# Patient Record
Sex: MALE | Race: OTHER | Employment: OTHER | URBAN - METROPOLITAN AREA
[De-identification: names, ages, dates, MRNs, and addresses within clinical notes are randomized per-mention and may not be internally consistent; named-entity substitution may affect disease eponyms.]

---

## 2019-06-08 ENCOUNTER — APPOINTMENT (OUTPATIENT)
Dept: CT IMAGING | Age: 79
End: 2019-06-08
Attending: EMERGENCY MEDICINE
Payer: MEDICARE

## 2019-06-08 ENCOUNTER — HOSPITAL ENCOUNTER (EMERGENCY)
Age: 79
Discharge: HOME | End: 2019-06-08
Attending: EMERGENCY MEDICINE
Payer: MEDICARE

## 2019-06-08 VITALS
WEIGHT: 140 LBS | TEMPERATURE: 98 F | DIASTOLIC BLOOD PRESSURE: 61 MMHG | BODY MASS INDEX: 24.8 KG/M2 | SYSTOLIC BLOOD PRESSURE: 160 MMHG | RESPIRATION RATE: 16 BRPM | OXYGEN SATURATION: 99 % | HEART RATE: 47 BPM | HEIGHT: 63 IN

## 2019-06-08 DIAGNOSIS — R42 VERTIGO: Primary | ICD-10-CM

## 2019-06-08 DIAGNOSIS — J32.9 SINUSITIS, UNSPECIFIED CHRONICITY, UNSPECIFIED LOCATION: ICD-10-CM

## 2019-06-08 PROCEDURE — 84484 ASSAY OF TROPONIN QUANT: CPT | Performed by: EMERGENCY MEDICINE

## 2019-06-08 PROCEDURE — 99285 EMERGENCY DEPT VISIT HI MDM: CPT

## 2019-06-08 PROCEDURE — 96360 HYDRATION IV INFUSION INIT: CPT

## 2019-06-08 PROCEDURE — 96361 HYDRATE IV INFUSION ADD-ON: CPT

## 2019-06-08 PROCEDURE — 85025 COMPLETE CBC W/AUTO DIFF WBC: CPT | Performed by: EMERGENCY MEDICINE

## 2019-06-08 PROCEDURE — 93010 ELECTROCARDIOGRAM REPORT: CPT

## 2019-06-08 PROCEDURE — 70450 CT HEAD/BRAIN W/O DYE: CPT | Performed by: EMERGENCY MEDICINE

## 2019-06-08 PROCEDURE — 93005 ELECTROCARDIOGRAM TRACING: CPT

## 2019-06-08 PROCEDURE — 80048 BASIC METABOLIC PNL TOTAL CA: CPT | Performed by: EMERGENCY MEDICINE

## 2019-06-08 RX ORDER — AMOXICILLIN AND CLAVULANATE POTASSIUM 875; 125 MG/1; MG/1
1 TABLET, FILM COATED ORAL 2 TIMES DAILY
Qty: 20 TABLET | Refills: 0 | Status: SHIPPED | OUTPATIENT
Start: 2019-06-08 | End: 2019-06-18

## 2019-06-08 RX ORDER — SODIUM CHLORIDE 9 MG/ML
125 INJECTION, SOLUTION INTRAVENOUS CONTINUOUS
Status: DISCONTINUED | OUTPATIENT
Start: 2019-06-08 | End: 2019-06-08

## 2019-06-08 RX ORDER — MECLIZINE HYDROCHLORIDE 25 MG/1
25 TABLET ORAL ONCE
Status: COMPLETED | OUTPATIENT
Start: 2019-06-08 | End: 2019-06-08

## 2019-06-08 RX ORDER — MECLIZINE HYDROCHLORIDE 25 MG/1
25 TABLET ORAL 3 TIMES DAILY PRN
Qty: 15 TABLET | Refills: 0 | Status: SHIPPED | OUTPATIENT
Start: 2019-06-08

## 2019-06-08 RX ORDER — ASPIRIN 81 MG/1
TABLET, CHEWABLE ORAL DAILY
COMMUNITY

## 2019-06-08 NOTE — ED PROVIDER NOTES
Patient Seen in: [facility] Emergency Department In [place]    History   Patient presents with:  Dizziness (neurologic)    Stated Complaint: dizziness that started this evening; + SOB;     HPI    68-year-old male presents emergency room for evaluation of 98.3 °F (36.8 °C)   Temp src Temporal   SpO2 98 %   O2 Device None (Room air)       Current:/61   Pulse (!) 47   Temp 98.3 °F (36.8 °C) (Temporal)   Resp 16   Ht 160 cm (5' 3\")   Wt 63.5 kg   SpO2 99%   BMI 24.80 kg/m²         Physical Exam    GENER CBC W/ DIFFERENTIAL[[id number]]                              Final result                 Please view results for these tests on the individual orders.    RAINBOW DRAW BLUE   RAINBOW DRAW LAVENDER   RAINBOW DRAW LIGHT GREEN   RAINBOW DRAW GOLD   CBC W/ DIF 0

## 2019-06-08 NOTE — ED INITIAL ASSESSMENT (HPI)
Dizziness that started 30 mins , has had  A nasal congestion last couple of days. C/o pain left arm, after doing yard work. Pain worse with movement. Feels like room is spinning. Denies cp. Denies nausea. Denies sob.

## 2021-01-01 ENCOUNTER — EXTERNAL RECORD (OUTPATIENT)
Dept: OTHER | Age: 81
End: 2021-01-01

## 2021-04-27 ENCOUNTER — TELEPHONE (OUTPATIENT)
Dept: ORTHOPEDICS | Age: 81
End: 2021-04-27

## 2021-04-27 ENCOUNTER — IMAGING SERVICES (OUTPATIENT)
Dept: GENERAL RADIOLOGY | Age: 81
End: 2021-04-27
Attending: ORTHOPAEDIC SURGERY

## 2021-04-27 ENCOUNTER — OFFICE VISIT (OUTPATIENT)
Dept: ORTHOPEDICS | Age: 81
End: 2021-04-27

## 2021-04-27 VITALS — BODY MASS INDEX: 25.04 KG/M2 | HEIGHT: 63 IN | WEIGHT: 141.31 LBS

## 2021-04-27 DIAGNOSIS — M17.12 PRIMARY OSTEOARTHRITIS OF LEFT KNEE: Primary | ICD-10-CM

## 2021-04-27 PROCEDURE — 99204 OFFICE O/P NEW MOD 45 MIN: CPT | Performed by: ORTHOPAEDIC SURGERY

## 2021-04-27 PROCEDURE — 73564 X-RAY EXAM KNEE 4 OR MORE: CPT | Performed by: ORTHOPAEDIC SURGERY

## 2021-04-27 RX ORDER — SIMVASTATIN 10 MG
TABLET ORAL
Status: ON HOLD | COMMUNITY
End: 2021-07-15 | Stop reason: ALTCHOICE

## 2021-04-27 RX ORDER — AMLODIPINE BESYLATE 10 MG/1
TABLET ORAL
Status: ON HOLD | COMMUNITY
End: 2021-07-15 | Stop reason: DRUGHIGH

## 2021-04-27 RX ORDER — HYALURONATE SODIUM 20 MG/2 ML
20 SYRINGE (ML) INTRAARTICULAR ONCE
Qty: 6 ML | Refills: 0 | Status: SHIPPED
Start: 2021-04-27 | End: 2021-04-27

## 2021-04-28 RX ORDER — HYALURONATE SODIUM 30 MG/2 ML
30 SYRINGE (ML) INTRAARTICULAR ONCE
Qty: 2 ML | Refills: 0 | Status: SHIPPED
Start: 2021-04-28 | End: 2021-04-28

## 2021-05-20 ENCOUNTER — OFFICE VISIT (OUTPATIENT)
Dept: ORTHOPEDICS | Age: 81
End: 2021-05-20

## 2021-05-20 VITALS — HEIGHT: 63 IN | BODY MASS INDEX: 25.04 KG/M2 | WEIGHT: 141.31 LBS

## 2021-05-20 DIAGNOSIS — M17.12 PRIMARY OSTEOARTHRITIS OF LEFT KNEE: Primary | ICD-10-CM

## 2021-05-20 PROCEDURE — 20610 DRAIN/INJ JOINT/BURSA W/O US: CPT | Performed by: ORTHOPAEDIC SURGERY

## 2021-05-27 ENCOUNTER — OFFICE VISIT (OUTPATIENT)
Dept: ORTHOPEDICS | Age: 81
End: 2021-05-27

## 2021-05-27 VITALS — BODY MASS INDEX: 25.04 KG/M2 | WEIGHT: 141.31 LBS | HEIGHT: 63 IN

## 2021-05-27 DIAGNOSIS — M17.12 PRIMARY OSTEOARTHRITIS OF LEFT KNEE: Primary | ICD-10-CM

## 2021-05-27 PROCEDURE — 20610 DRAIN/INJ JOINT/BURSA W/O US: CPT | Performed by: ORTHOPAEDIC SURGERY

## 2021-06-03 ENCOUNTER — OFFICE VISIT (OUTPATIENT)
Dept: ORTHOPEDICS | Age: 81
End: 2021-06-03

## 2021-06-03 VITALS — WEIGHT: 141.31 LBS | HEIGHT: 63 IN | BODY MASS INDEX: 25.04 KG/M2

## 2021-06-03 DIAGNOSIS — M17.12 PRIMARY OSTEOARTHRITIS OF LEFT KNEE: Primary | ICD-10-CM

## 2021-06-03 PROCEDURE — 20610 DRAIN/INJ JOINT/BURSA W/O US: CPT | Performed by: ORTHOPAEDIC SURGERY

## 2021-06-21 ENCOUNTER — HOSPITAL ENCOUNTER (OUTPATIENT)
Dept: MRI IMAGING | Age: 81
Discharge: HOME | End: 2021-06-21
Attending: UROLOGY

## 2021-06-21 DIAGNOSIS — R97.20 ELEVATED PROSTATE SPECIFIC ANTIGEN (PSA): ICD-10-CM

## 2021-06-21 PROCEDURE — 10002805 HB CONTRAST AGENT: Performed by: UROLOGY

## 2021-06-21 PROCEDURE — A9577 INJ MULTIHANCE: HCPCS | Performed by: UROLOGY

## 2021-06-21 PROCEDURE — 72197 MRI PELVIS W/O & W/DYE: CPT

## 2021-06-21 RX ADMIN — GADOBENATE DIMEGLUMINE 14 ML: 529 INJECTION, SOLUTION INTRAVENOUS at 10:46

## 2021-07-13 ENCOUNTER — ANESTHESIA EVENT (OUTPATIENT)
Dept: SURGERY | Age: 81
DRG: 141 | End: 2021-07-13

## 2021-07-13 RX ORDER — LEVOTHYROXINE SODIUM 0.03 MG/1
25 TABLET ORAL DAILY
COMMUNITY

## 2021-07-13 ASSESSMENT — ACTIVITIES OF DAILY LIVING (ADL)
ADL_SCORE: 12
ADL_BEFORE_ADMISSION: INDEPENDENT
SENSORY_SUPPORT_DEVICES: EYEGLASSES

## 2021-07-14 ENCOUNTER — ANESTHESIA (OUTPATIENT)
Dept: SURGERY | Age: 81
DRG: 141 | End: 2021-07-14

## 2021-07-14 ENCOUNTER — HOSPITAL ENCOUNTER (INPATIENT)
Age: 81
LOS: 3 days | Discharge: HOME | DRG: 141 | End: 2021-07-17
Attending: OTOLARYNGOLOGY | Admitting: OTOLARYNGOLOGY

## 2021-07-14 DIAGNOSIS — Z01.812 PRE-PROCEDURAL LABORATORY EXAMINATIONS: Primary | ICD-10-CM

## 2021-07-14 DIAGNOSIS — C02.9 MALIGNANT NEOPLASM OF TONGUE, UNSPECIFIED (CMD): ICD-10-CM

## 2021-07-14 LAB
ABO + RH BLD: NORMAL
ANION GAP SERPL CALC-SCNC: 14 MMOL/L (ref 10–20)
BASOPHILS # BLD: 0 K/MCL (ref 0–0.3)
BASOPHILS NFR BLD: 0 %
BLD GP AB SCN SERPL QL GEL: NEGATIVE
BUN SERPL-MCNC: 17 MG/DL (ref 6–20)
BUN/CREAT SERPL: 16 (ref 7–25)
CALCIUM SERPL-MCNC: 8.6 MG/DL (ref 8.4–10.2)
CHLORIDE SERPL-SCNC: 105 MMOL/L (ref 98–107)
CO2 SERPL-SCNC: 25 MMOL/L (ref 21–32)
CREAT SERPL-MCNC: 1.04 MG/DL (ref 0.67–1.17)
DEPRECATED RDW RBC: 43.8 FL (ref 39–50)
EOSINOPHIL # BLD: 0 K/MCL (ref 0–0.5)
EOSINOPHIL NFR BLD: 0 %
ERYTHROCYTE [DISTWIDTH] IN BLOOD: 13.9 % (ref 11–15)
FASTING DURATION TIME PATIENT: ABNORMAL H
GFR SERPLBLD BASED ON 1.73 SQ M-ARVRAT: 67 ML/MIN/1.73M2
GLUCOSE SERPL-MCNC: 193 MG/DL (ref 65–99)
HCT VFR BLD CALC: 40.2 % (ref 39–51)
HGB BLD-MCNC: 13.7 G/DL (ref 13–17)
IMM GRANULOCYTES # BLD AUTO: 0 K/MCL (ref 0–0.2)
IMM GRANULOCYTES # BLD: 0 %
LYMPHOCYTES # BLD: 1 K/MCL (ref 1–4)
LYMPHOCYTES NFR BLD: 8 %
MAGNESIUM SERPL-MCNC: 1.8 MG/DL (ref 1.7–2.4)
MCH RBC QN AUTO: 29.4 PG (ref 26–34)
MCHC RBC AUTO-ENTMCNC: 34.1 G/DL (ref 32–36.5)
MCV RBC AUTO: 86.3 FL (ref 78–100)
MONOCYTES # BLD: 0.1 K/MCL (ref 0.3–0.9)
MONOCYTES NFR BLD: 1 %
NEUTROPHILS # BLD: 11.9 K/MCL (ref 1.8–7.7)
NEUTROPHILS NFR BLD: 91 %
NRBC BLD MANUAL-RTO: 0 /100 WBC
PHOSPHATE SERPL-MCNC: 2.9 MG/DL (ref 2.4–4.7)
PLATELET # BLD AUTO: 185 K/MCL (ref 140–450)
POTASSIUM SERPL-SCNC: 3.5 MMOL/L (ref 3.4–5.1)
RBC # BLD: 4.66 MIL/MCL (ref 4.5–5.9)
SODIUM SERPL-SCNC: 140 MMOL/L (ref 135–145)
TYPE AND SCREEN EXPIRATION DATE: NORMAL
WBC # BLD: 13.1 K/MCL (ref 4.2–11)

## 2021-07-14 PROCEDURE — 80048 BASIC METABOLIC PNL TOTAL CA: CPT | Performed by: STUDENT IN AN ORGANIZED HEALTH CARE EDUCATION/TRAINING PROGRAM

## 2021-07-14 PROCEDURE — 10002801 HB RX 250 W/O HCPCS: Performed by: STUDENT IN AN ORGANIZED HEALTH CARE EDUCATION/TRAINING PROGRAM

## 2021-07-14 PROCEDURE — 10002807 HB RX 258: Performed by: STUDENT IN AN ORGANIZED HEALTH CARE EDUCATION/TRAINING PROGRAM

## 2021-07-14 PROCEDURE — 10002803 HB RX 637: Performed by: SURGERY

## 2021-07-14 PROCEDURE — 10002800 HB RX 250 W HCPCS: Performed by: STUDENT IN AN ORGANIZED HEALTH CARE EDUCATION/TRAINING PROGRAM

## 2021-07-14 PROCEDURE — 07T20ZZ RESECTION OF LEFT NECK LYMPHATIC, OPEN APPROACH: ICD-10-PCS | Performed by: OTOLARYNGOLOGY

## 2021-07-14 PROCEDURE — 10002800 HB RX 250 W HCPCS: Performed by: OTOLARYNGOLOGY

## 2021-07-14 PROCEDURE — 85025 COMPLETE CBC W/AUTO DIFF WBC: CPT | Performed by: STUDENT IN AN ORGANIZED HEALTH CARE EDUCATION/TRAINING PROGRAM

## 2021-07-14 PROCEDURE — 10006023 HB SUPPLY 272: Performed by: OTOLARYNGOLOGY

## 2021-07-14 PROCEDURE — 13000037 HB COMPLEX CASE EACH ADD MINUTE: Performed by: OTOLARYNGOLOGY

## 2021-07-14 PROCEDURE — 0HBJXZZ EXCISION OF LEFT UPPER LEG SKIN, EXTERNAL APPROACH: ICD-10-PCS | Performed by: OTOLARYNGOLOGY

## 2021-07-14 PROCEDURE — 13000003 HB ANESTHESIA  GENERAL EA ADD MINUTE: Performed by: OTOLARYNGOLOGY

## 2021-07-14 PROCEDURE — 83735 ASSAY OF MAGNESIUM: CPT | Performed by: STUDENT IN AN ORGANIZED HEALTH CARE EDUCATION/TRAINING PROGRAM

## 2021-07-14 PROCEDURE — 10004451 HB PACU RECOVERY 1ST 30 MINUTES: Performed by: OTOLARYNGOLOGY

## 2021-07-14 PROCEDURE — 0CB7XZZ EXCISION OF TONGUE, EXTERNAL APPROACH: ICD-10-PCS | Performed by: OTOLARYNGOLOGY

## 2021-07-14 PROCEDURE — 10004651 HB RX, NO CHARGE ITEM: Performed by: OTOLARYNGOLOGY

## 2021-07-14 PROCEDURE — 99291 CRITICAL CARE FIRST HOUR: CPT | Performed by: SURGERY

## 2021-07-14 PROCEDURE — 10002801 HB RX 250 W/O HCPCS: Performed by: OTOLARYNGOLOGY

## 2021-07-14 PROCEDURE — 10002803 HB RX 637: Performed by: OTOLARYNGOLOGY

## 2021-07-14 PROCEDURE — 13000036 HB COMPLEX  CASE S/U + 1ST 15 MIN: Performed by: OTOLARYNGOLOGY

## 2021-07-14 PROCEDURE — 86901 BLOOD TYPING SEROLOGIC RH(D): CPT | Performed by: OTOLARYNGOLOGY

## 2021-07-14 PROCEDURE — 10002803 HB RX 637

## 2021-07-14 PROCEDURE — 84100 ASSAY OF PHOSPHORUS: CPT | Performed by: STUDENT IN AN ORGANIZED HEALTH CARE EDUCATION/TRAINING PROGRAM

## 2021-07-14 PROCEDURE — 13000002 HB ANESTHESIA  GENERAL  S/U + 1ST 15 MIN: Performed by: OTOLARYNGOLOGY

## 2021-07-14 PROCEDURE — 10003585 HB ROOM CHARGE INTERMEDIATE CARE

## 2021-07-14 PROCEDURE — 0HR1X74 REPLACEMENT OF FACE SKIN WITH AUTOLOGOUS TISSUE SUBSTITUTE, PARTIAL THICKNESS, EXTERNAL APPROACH: ICD-10-PCS | Performed by: OTOLARYNGOLOGY

## 2021-07-14 PROCEDURE — 88331 PATH CONSLTJ SURG 1 BLK 1SPC: CPT | Performed by: OTOLARYNGOLOGY

## 2021-07-14 PROCEDURE — 10002016 HB COUNTER INCENTIVE SPIROMETRY

## 2021-07-14 RX ORDER — PROPOFOL 10 MG/ML
INJECTION, EMULSION INTRAVENOUS PRN
Status: DISCONTINUED | OUTPATIENT
Start: 2021-07-14 | End: 2021-07-14

## 2021-07-14 RX ORDER — ROCURONIUM BROMIDE 10 MG/ML
INJECTION, SOLUTION INTRAVENOUS PRN
Status: DISCONTINUED | OUTPATIENT
Start: 2021-07-14 | End: 2021-07-14

## 2021-07-14 RX ORDER — CLINDAMYCIN PHOSPHATE 900 MG/50ML
900 INJECTION INTRAVENOUS EVERY 8 HOURS SCHEDULED
Status: COMPLETED | OUTPATIENT
Start: 2021-07-14 | End: 2021-07-14

## 2021-07-14 RX ORDER — ONDANSETRON 2 MG/ML
4 INJECTION INTRAMUSCULAR; INTRAVENOUS EVERY 6 HOURS PRN
Status: DISCONTINUED | OUTPATIENT
Start: 2021-07-14 | End: 2021-07-17 | Stop reason: HOSPADM

## 2021-07-14 RX ORDER — ACETAMINOPHEN 325 MG/1
650 TABLET ORAL EVERY 4 HOURS PRN
Status: DISCONTINUED | OUTPATIENT
Start: 2021-07-14 | End: 2021-07-14

## 2021-07-14 RX ORDER — LIDOCAINE HYDROCHLORIDE AND EPINEPHRINE 10; 10 MG/ML; UG/ML
INJECTION, SOLUTION INFILTRATION; PERINEURAL PRN
Status: DISCONTINUED | OUTPATIENT
Start: 2021-07-14 | End: 2021-07-14 | Stop reason: HOSPADM

## 2021-07-14 RX ORDER — MINERAL OIL
OIL (ML) MISCELLANEOUS PRN
Status: DISCONTINUED | OUTPATIENT
Start: 2021-07-14 | End: 2021-07-14 | Stop reason: HOSPADM

## 2021-07-14 RX ORDER — EPHEDRINE SULFATE/0.9% NACL/PF 50 MG/10ML
SYRINGE (ML) INTRAVENOUS PRN
Status: DISCONTINUED | OUTPATIENT
Start: 2021-07-14 | End: 2021-07-14

## 2021-07-14 RX ORDER — METOCLOPRAMIDE HYDROCHLORIDE 5 MG/ML
5 INJECTION INTRAMUSCULAR; INTRAVENOUS EVERY 6 HOURS PRN
Status: DISCONTINUED | OUTPATIENT
Start: 2021-07-14 | End: 2021-07-14 | Stop reason: HOSPADM

## 2021-07-14 RX ORDER — ONDANSETRON 2 MG/ML
4 INJECTION INTRAMUSCULAR; INTRAVENOUS 2 TIMES DAILY PRN
Status: DISCONTINUED | OUTPATIENT
Start: 2021-07-14 | End: 2021-07-14 | Stop reason: HOSPADM

## 2021-07-14 RX ORDER — SODIUM CHLORIDE 9 MG/ML
INJECTION, SOLUTION INTRAVENOUS CONTINUOUS PRN
Status: DISCONTINUED | OUTPATIENT
Start: 2021-07-14 | End: 2021-07-14

## 2021-07-14 RX ORDER — 0.9 % SODIUM CHLORIDE 0.9 %
2 VIAL (ML) INJECTION EVERY 12 HOURS SCHEDULED
Status: DISCONTINUED | OUTPATIENT
Start: 2021-07-14 | End: 2021-07-17 | Stop reason: HOSPADM

## 2021-07-14 RX ORDER — HYDRALAZINE HYDROCHLORIDE 20 MG/ML
10 INJECTION INTRAMUSCULAR; INTRAVENOUS EVERY 6 HOURS PRN
Status: DISCONTINUED | OUTPATIENT
Start: 2021-07-14 | End: 2021-07-17 | Stop reason: HOSPADM

## 2021-07-14 RX ORDER — SODIUM CHLORIDE, SODIUM LACTATE, POTASSIUM CHLORIDE, CALCIUM CHLORIDE 600; 310; 30; 20 MG/100ML; MG/100ML; MG/100ML; MG/100ML
INJECTION, SOLUTION INTRAVENOUS CONTINUOUS
Status: DISCONTINUED | OUTPATIENT
Start: 2021-07-14 | End: 2021-07-17 | Stop reason: HOSPADM

## 2021-07-14 RX ORDER — BACITRACIN ZINC AND POLYMYXIN B SULFATE 500; 1000 [USP'U]/G; [USP'U]/G
OINTMENT TOPICAL PRN
Status: DISCONTINUED | OUTPATIENT
Start: 2021-07-14 | End: 2021-07-14 | Stop reason: HOSPADM

## 2021-07-14 RX ORDER — LIDOCAINE HYDROCHLORIDE 20 MG/ML
INJECTION, SOLUTION INFILTRATION; PERINEURAL PRN
Status: DISCONTINUED | OUTPATIENT
Start: 2021-07-14 | End: 2021-07-14

## 2021-07-14 RX ORDER — GLYCOPYRROLATE 0.2 MG/ML
INJECTION, SOLUTION INTRAMUSCULAR; INTRAVENOUS PRN
Status: DISCONTINUED | OUTPATIENT
Start: 2021-07-14 | End: 2021-07-14

## 2021-07-14 RX ORDER — SODIUM CHLORIDE, SODIUM LACTATE, POTASSIUM CHLORIDE, CALCIUM CHLORIDE 600; 310; 30; 20 MG/100ML; MG/100ML; MG/100ML; MG/100ML
INJECTION, SOLUTION INTRAVENOUS CONTINUOUS PRN
Status: DISCONTINUED | OUTPATIENT
Start: 2021-07-14 | End: 2021-07-14

## 2021-07-14 RX ORDER — POTASSIUM CHLORIDE 1.5 G/1.58G
40 POWDER, FOR SOLUTION ORAL ONCE
Status: COMPLETED | OUTPATIENT
Start: 2021-07-14 | End: 2021-07-14

## 2021-07-14 RX ORDER — ACETAMINOPHEN 650 MG/1
650 SUPPOSITORY RECTAL EVERY 4 HOURS PRN
Status: DISCONTINUED | OUTPATIENT
Start: 2021-07-14 | End: 2021-07-14 | Stop reason: HOSPADM

## 2021-07-14 RX ORDER — ACETAMINOPHEN 325 MG/1
650 TABLET ORAL EVERY 4 HOURS PRN
Status: DISCONTINUED | OUTPATIENT
Start: 2021-07-14 | End: 2021-07-14 | Stop reason: HOSPADM

## 2021-07-14 RX ORDER — CHLORHEXIDINE GLUCONATE ORAL RINSE 1.2 MG/ML
15 SOLUTION DENTAL EVERY 12 HOURS SCHEDULED
Status: DISCONTINUED | OUTPATIENT
Start: 2021-07-14 | End: 2021-07-17 | Stop reason: HOSPADM

## 2021-07-14 RX ORDER — DEXAMETHASONE SODIUM PHOSPHATE 4 MG/ML
INJECTION, SOLUTION INTRA-ARTICULAR; INTRALESIONAL; INTRAMUSCULAR; INTRAVENOUS; SOFT TISSUE PRN
Status: DISCONTINUED | OUTPATIENT
Start: 2021-07-14 | End: 2021-07-14

## 2021-07-14 RX ORDER — SODIUM CHLORIDE, SODIUM LACTATE, POTASSIUM CHLORIDE, CALCIUM CHLORIDE 600; 310; 30; 20 MG/100ML; MG/100ML; MG/100ML; MG/100ML
10 INJECTION, SOLUTION INTRAVENOUS CONTINUOUS
Status: DISCONTINUED | OUTPATIENT
Start: 2021-07-14 | End: 2021-07-14 | Stop reason: HOSPADM

## 2021-07-14 RX ORDER — MIDAZOLAM HYDROCHLORIDE 1 MG/ML
INJECTION, SOLUTION INTRAMUSCULAR; INTRAVENOUS PRN
Status: DISCONTINUED | OUTPATIENT
Start: 2021-07-14 | End: 2021-07-14

## 2021-07-14 RX ORDER — BISACODYL 10 MG
10 SUPPOSITORY, RECTAL RECTAL DAILY PRN
Status: DISCONTINUED | OUTPATIENT
Start: 2021-07-14 | End: 2021-07-17 | Stop reason: HOSPADM

## 2021-07-14 RX ORDER — PROCHLORPERAZINE EDISYLATE 5 MG/ML
5 INJECTION INTRAMUSCULAR; INTRAVENOUS EVERY 4 HOURS PRN
Status: DISCONTINUED | OUTPATIENT
Start: 2021-07-14 | End: 2021-07-14 | Stop reason: HOSPADM

## 2021-07-14 RX ORDER — ONDANSETRON 2 MG/ML
INJECTION INTRAMUSCULAR; INTRAVENOUS PRN
Status: DISCONTINUED | OUTPATIENT
Start: 2021-07-14 | End: 2021-07-14

## 2021-07-14 RX ORDER — CLINDAMYCIN PHOSPHATE 900 MG/50ML
900 INJECTION INTRAVENOUS ONCE
Status: COMPLETED | OUTPATIENT
Start: 2021-07-14 | End: 2021-07-14

## 2021-07-14 RX ADMIN — Medication 10 MG: at 09:01

## 2021-07-14 RX ADMIN — LIDOCAINE HYDROCHLORIDE 5 ML: 20 INJECTION, SOLUTION INFILTRATION; PERINEURAL at 08:53

## 2021-07-14 RX ADMIN — FENTANYL CITRATE 25 MCG: 50 INJECTION INTRAMUSCULAR; INTRAVENOUS at 14:30

## 2021-07-14 RX ADMIN — CLINDAMYCIN IN 5 PERCENT DEXTROSE 900 MG: 18 INJECTION, SOLUTION INTRAVENOUS at 21:48

## 2021-07-14 RX ADMIN — ROCURONIUM BROMIDE 50 MG: 50 INJECTION, SOLUTION INTRAVENOUS at 08:53

## 2021-07-14 RX ADMIN — SODIUM CHLORIDE: 9 INJECTION, SOLUTION INTRAVENOUS at 08:53

## 2021-07-14 RX ADMIN — SODIUM CHLORIDE, SODIUM LACTATE, POTASSIUM CHLORIDE, AND CALCIUM CHLORIDE: .6; .31; .03; .02 INJECTION, SOLUTION INTRAVENOUS at 15:09

## 2021-07-14 RX ADMIN — FENTANYL CITRATE 100 MCG: 50 INJECTION, SOLUTION INTRAMUSCULAR; INTRAVENOUS at 08:53

## 2021-07-14 RX ADMIN — SODIUM CHLORIDE, POTASSIUM CHLORIDE, SODIUM LACTATE AND CALCIUM CHLORIDE: 600; 310; 30; 20 INJECTION, SOLUTION INTRAVENOUS at 12:05

## 2021-07-14 RX ADMIN — ONDANSETRON 4 MG: 2 INJECTION INTRAMUSCULAR; INTRAVENOUS at 13:33

## 2021-07-14 RX ADMIN — FENTANYL CITRATE 50 MCG: 50 INJECTION INTRAMUSCULAR; INTRAVENOUS at 14:08

## 2021-07-14 RX ADMIN — DEXAMETHASONE SODIUM PHOSPHATE 8 MG: 4 INJECTION, SOLUTION INTRAMUSCULAR; INTRAVENOUS at 08:53

## 2021-07-14 RX ADMIN — GLYCOPYRROLATE 0.4 MG: 0.2 INJECTION, SOLUTION INTRAMUSCULAR; INTRAVENOUS at 11:59

## 2021-07-14 RX ADMIN — FENTANYL CITRATE 25 MCG: 50 INJECTION INTRAMUSCULAR; INTRAVENOUS at 14:16

## 2021-07-14 RX ADMIN — POTASSIUM CHLORIDE 40 MEQ: 1.5 FOR SOLUTION ORAL at 17:41

## 2021-07-14 RX ADMIN — MORPHINE SULFATE 2 MG: 2 INJECTION, SOLUTION INTRAMUSCULAR; INTRAVENOUS at 15:38

## 2021-07-14 RX ADMIN — MIDAZOLAM HYDROCHLORIDE 2 MG: 1 INJECTION, SOLUTION INTRAMUSCULAR; INTRAVENOUS at 08:52

## 2021-07-14 RX ADMIN — SODIUM CHLORIDE, PRESERVATIVE FREE 2 ML: 5 INJECTION INTRAVENOUS at 21:04

## 2021-07-14 RX ADMIN — CLINDAMYCIN IN 5 PERCENT DEXTROSE 900 MG: 18 INJECTION, SOLUTION INTRAVENOUS at 15:43

## 2021-07-14 RX ADMIN — PROPOFOL 100 MG: 10 INJECTION, EMULSION INTRAVENOUS at 08:53

## 2021-07-14 RX ADMIN — CLINDAMYCIN IN 5 PERCENT DEXTROSE 900 MG: 18 INJECTION, SOLUTION INTRAVENOUS at 09:04

## 2021-07-14 ASSESSMENT — COGNITIVE AND FUNCTIONAL STATUS - GENERAL
DO YOU HAVE SERIOUS DIFFICULTY WALKING OR CLIMBING STAIRS: NO
ARE YOU BLIND OR DO YOU HAVE SERIOUS DIFFICULTY SEEING, EVEN WHEN WEARING GLASSES: NO
BECAUSE OF A PHYSICAL, MENTAL, OR EMOTIONAL CONDITION, DO YOU HAVE DIFFICULTY DOING ERRANDS ALONE: NO
ARE YOU DEAF OR DO YOU HAVE SERIOUS DIFFICULTY  HEARING: NO
DO YOU HAVE DIFFICULTY DRESSING OR BATHING: NO
BECAUSE OF A PHYSICAL, MENTAL, OR EMOTIONAL CONDITION, DO YOU HAVE SERIOUS DIFFICULTY CONCENTRATING, REMEMBERING OR MAKING DECISIONS: NO

## 2021-07-14 ASSESSMENT — ACTIVITIES OF DAILY LIVING (ADL)
ADL_SCORE: 12
ADL_SHORT_OF_BREATH: NO
RECENT_DECLINE_ADL: NO
CHRONIC_PAIN_PRESENT: NO
ADL_BEFORE_ADMISSION: INDEPENDENT

## 2021-07-14 ASSESSMENT — ENCOUNTER SYMPTOMS
ABDOMINAL PAIN: 0
SHORTNESS OF BREATH: 0
APPETITE CHANGE: 0
BACK PAIN: 0
AGITATION: 0
WEAKNESS: 0
DIARRHEA: 0
ACTIVITY CHANGE: 0
ABDOMINAL DISTENTION: 0
CONFUSION: 0
CONSTIPATION: 0
NAUSEA: 0
DIZZINESS: 0
COLOR CHANGE: 0
VOMITING: 0

## 2021-07-14 ASSESSMENT — PAIN SCALES - GENERAL
PAINLEVEL_OUTOF10: 3
PAINLEVEL_OUTOF10: 6
PAINLEVEL_OUTOF10: 5
PAINLEVEL_OUTOF10: 2
PAINLEVEL_OUTOF10: 5

## 2021-07-14 ASSESSMENT — LIFESTYLE VARIABLES
AUDIT-C TOTAL SCORE: 0
HOW OFTEN DO YOU HAVE 6 OR MORE DRINKS ON ONE OCCASION: NEVER
HOW MANY STANDARD DRINKS CONTAINING ALCOHOL DO YOU HAVE ON A TYPICAL DAY: 0,1 OR 2
HOW OFTEN DO YOU HAVE A DRINK CONTAINING ALCOHOL: NEVER
ALCOHOL_USE_STATUS: NO OR LOW RISK WITH VALIDATED TOOL

## 2021-07-14 ASSESSMENT — PATIENT HEALTH QUESTIONNAIRE - PHQ9: IS PATIENT ABLE TO COMPLETE PHQ2 OR PHQ9: NO, DEFER TO LATER TIME

## 2021-07-15 PROBLEM — I10 ESSENTIAL HYPERTENSION: Status: ACTIVE | Noted: 2021-07-15

## 2021-07-15 PROBLEM — E03.9 ACQUIRED HYPOTHYROIDISM: Status: ACTIVE | Noted: 2021-07-15

## 2021-07-15 PROBLEM — G89.18 POST-OP PAIN: Status: ACTIVE | Noted: 2021-07-15

## 2021-07-15 PROBLEM — E78.49 OTHER HYPERLIPIDEMIA: Status: ACTIVE | Noted: 2021-07-15

## 2021-07-15 LAB
ANION GAP SERPL CALC-SCNC: 11 MMOL/L (ref 10–20)
BASOPHILS # BLD: 0 K/MCL (ref 0–0.3)
BASOPHILS NFR BLD: 0 %
BUN SERPL-MCNC: 15 MG/DL (ref 6–20)
BUN/CREAT SERPL: 18 (ref 7–25)
CALCIUM SERPL-MCNC: 8.6 MG/DL (ref 8.4–10.2)
CHLORIDE SERPL-SCNC: 107 MMOL/L (ref 98–107)
CO2 SERPL-SCNC: 24 MMOL/L (ref 21–32)
CREAT SERPL-MCNC: 0.84 MG/DL (ref 0.67–1.17)
DEPRECATED RDW RBC: 43.9 FL (ref 39–50)
EOSINOPHIL # BLD: 0 K/MCL (ref 0–0.5)
EOSINOPHIL NFR BLD: 0 %
ERYTHROCYTE [DISTWIDTH] IN BLOOD: 14 % (ref 11–15)
FASTING DURATION TIME PATIENT: ABNORMAL H
GFR SERPLBLD BASED ON 1.73 SQ M-ARVRAT: 82 ML/MIN/1.73M2
GLUCOSE SERPL-MCNC: 128 MG/DL (ref 65–99)
HCT VFR BLD CALC: 37 % (ref 39–51)
HGB BLD-MCNC: 12.7 G/DL (ref 13–17)
IMM GRANULOCYTES # BLD AUTO: 0.1 K/MCL (ref 0–0.2)
IMM GRANULOCYTES # BLD: 1 %
LYMPHOCYTES # BLD: 1.2 K/MCL (ref 1–4)
LYMPHOCYTES NFR BLD: 9 %
MAGNESIUM SERPL-MCNC: 1.9 MG/DL (ref 1.7–2.4)
MCH RBC QN AUTO: 29.5 PG (ref 26–34)
MCHC RBC AUTO-ENTMCNC: 34.3 G/DL (ref 32–36.5)
MCV RBC AUTO: 86 FL (ref 78–100)
MONOCYTES # BLD: 1 K/MCL (ref 0.3–0.9)
MONOCYTES NFR BLD: 7 %
NEUTROPHILS # BLD: 11.1 K/MCL (ref 1.8–7.7)
NEUTROPHILS NFR BLD: 83 %
NRBC BLD MANUAL-RTO: 0 /100 WBC
PHOSPHATE SERPL-MCNC: 3.7 MG/DL (ref 2.4–4.7)
PLATELET # BLD AUTO: 174 K/MCL (ref 140–450)
POTASSIUM SERPL-SCNC: 4.2 MMOL/L (ref 3.4–5.1)
RBC # BLD: 4.3 MIL/MCL (ref 4.5–5.9)
SODIUM SERPL-SCNC: 138 MMOL/L (ref 135–145)
WBC # BLD: 13.4 K/MCL (ref 4.2–11)

## 2021-07-15 PROCEDURE — 99232 SBSQ HOSP IP/OBS MODERATE 35: CPT | Performed by: SURGERY

## 2021-07-15 PROCEDURE — 10002807 HB RX 258: Performed by: SURGERY

## 2021-07-15 PROCEDURE — 10004651 HB RX, NO CHARGE ITEM: Performed by: OTOLARYNGOLOGY

## 2021-07-15 PROCEDURE — 10002803 HB RX 637: Performed by: OTOLARYNGOLOGY

## 2021-07-15 PROCEDURE — 83735 ASSAY OF MAGNESIUM: CPT | Performed by: STUDENT IN AN ORGANIZED HEALTH CARE EDUCATION/TRAINING PROGRAM

## 2021-07-15 PROCEDURE — 10002807 HB RX 258: Performed by: STUDENT IN AN ORGANIZED HEALTH CARE EDUCATION/TRAINING PROGRAM

## 2021-07-15 PROCEDURE — 80048 BASIC METABOLIC PNL TOTAL CA: CPT | Performed by: STUDENT IN AN ORGANIZED HEALTH CARE EDUCATION/TRAINING PROGRAM

## 2021-07-15 PROCEDURE — 84100 ASSAY OF PHOSPHORUS: CPT | Performed by: STUDENT IN AN ORGANIZED HEALTH CARE EDUCATION/TRAINING PROGRAM

## 2021-07-15 PROCEDURE — 10000002 HB ROOM CHARGE MED SURG

## 2021-07-15 PROCEDURE — 99223 1ST HOSP IP/OBS HIGH 75: CPT | Performed by: INTERNAL MEDICINE

## 2021-07-15 PROCEDURE — 10002800 HB RX 250 W HCPCS: Performed by: STUDENT IN AN ORGANIZED HEALTH CARE EDUCATION/TRAINING PROGRAM

## 2021-07-15 PROCEDURE — 10002803 HB RX 637: Performed by: SURGERY

## 2021-07-15 PROCEDURE — 85025 COMPLETE CBC W/AUTO DIFF WBC: CPT | Performed by: STUDENT IN AN ORGANIZED HEALTH CARE EDUCATION/TRAINING PROGRAM

## 2021-07-15 RX ORDER — ENOXAPARIN SODIUM 100 MG/ML
40 INJECTION SUBCUTANEOUS DAILY
Status: DISCONTINUED | OUTPATIENT
Start: 2021-07-15 | End: 2021-07-17 | Stop reason: HOSPADM

## 2021-07-15 RX ORDER — MAGNESIUM SULFATE 1 G/100ML
1 INJECTION INTRAVENOUS ONCE
Status: COMPLETED | OUTPATIENT
Start: 2021-07-15 | End: 2021-07-15

## 2021-07-15 RX ORDER — AMLODIPINE BESYLATE 10 MG/1
10 TABLET ORAL DAILY
Status: DISCONTINUED | OUTPATIENT
Start: 2021-07-16 | End: 2021-07-16

## 2021-07-15 RX ORDER — ATORVASTATIN CALCIUM 10 MG/1
10 TABLET, FILM COATED ORAL NIGHTLY
Status: DISCONTINUED | OUTPATIENT
Start: 2021-07-15 | End: 2021-07-17 | Stop reason: HOSPADM

## 2021-07-15 RX ORDER — AMLODIPINE BESYLATE 5 MG/1
7.5 TABLET ORAL DAILY
COMMUNITY
Start: 2021-06-04

## 2021-07-15 RX ORDER — ATORVASTATIN CALCIUM 40 MG/1
40 TABLET, FILM COATED ORAL DAILY
COMMUNITY

## 2021-07-15 RX ORDER — LEVOTHYROXINE SODIUM 0.03 MG/1
25 TABLET ORAL DAILY
Status: DISCONTINUED | OUTPATIENT
Start: 2021-07-15 | End: 2021-07-17 | Stop reason: HOSPADM

## 2021-07-15 RX ADMIN — ENOXAPARIN SODIUM 40 MG: 40 INJECTION SUBCUTANEOUS at 09:11

## 2021-07-15 RX ADMIN — MAGNESIUM SULFATE HEPTAHYDRATE 1 G: 1 INJECTION, SOLUTION INTRAVENOUS at 09:12

## 2021-07-15 RX ADMIN — LEVOTHYROXINE SODIUM 25 MCG: 0.03 TABLET ORAL at 09:11

## 2021-07-15 RX ADMIN — CHLORHEXIDINE GLUCONATE 15 ML: 1.2 RINSE ORAL at 21:29

## 2021-07-15 RX ADMIN — ATORVASTATIN CALCIUM 10 MG: 10 TABLET, FILM COATED ORAL at 21:28

## 2021-07-15 RX ADMIN — CHLORHEXIDINE GLUCONATE 15 ML: 1.2 RINSE ORAL at 08:20

## 2021-07-15 RX ADMIN — SODIUM CHLORIDE, PRESERVATIVE FREE 2 ML: 5 INJECTION INTRAVENOUS at 08:21

## 2021-07-15 RX ADMIN — SODIUM CHLORIDE, SODIUM LACTATE, POTASSIUM CHLORIDE, AND CALCIUM CHLORIDE: .6; .31; .03; .02 INJECTION, SOLUTION INTRAVENOUS at 05:56

## 2021-07-15 RX ADMIN — SODIUM CHLORIDE, PRESERVATIVE FREE 2 ML: 5 INJECTION INTRAVENOUS at 21:30

## 2021-07-15 ASSESSMENT — PAIN SCALES - GENERAL
PAINLEVEL_OUTOF10: 0

## 2021-07-15 ASSESSMENT — MOVEMENT AND STRENGTH ASSESSMENTS
RUE_ASSESSMENT: NORMAL/COMPLETE ROM AGAINST GRAVITY WITH FULL RESISTANCE
LLE_ASSESSMENT: NORMAL/COMPLETE ROM AGAINST GRAVITY WITH FULL RESISTANCE
RUE_ASSESSMENT: NORMAL/COMPLETE ROM AGAINST GRAVITY WITH FULL RESISTANCE
RLE_ASSESSMENT: NORMAL/COMPLETE ROM AGAINST GRAVITY WITH FULL RESISTANCE
LUE_ASSESSMENT: NORMAL/COMPLETE ROM AGAINST GRAVITY WITH FULL RESISTANCE
LUE_ASSESSMENT: NORMAL/COMPLETE ROM AGAINST GRAVITY WITH FULL RESISTANCE
RLE_ASSESSMENT: NORMAL/COMPLETE ROM AGAINST GRAVITY WITH FULL RESISTANCE
LLE_ASSESSMENT: NORMAL/COMPLETE ROM AGAINST GRAVITY WITH FULL RESISTANCE

## 2021-07-16 LAB
ANION GAP SERPL CALC-SCNC: 10 MMOL/L (ref 10–20)
BASOPHILS # BLD: 0.1 K/MCL (ref 0–0.3)
BASOPHILS NFR BLD: 1 %
BUN SERPL-MCNC: 14 MG/DL (ref 6–20)
BUN/CREAT SERPL: 17 (ref 7–25)
CALCIUM SERPL-MCNC: 8.6 MG/DL (ref 8.4–10.2)
CHLORIDE SERPL-SCNC: 105 MMOL/L (ref 98–107)
CO2 SERPL-SCNC: 27 MMOL/L (ref 21–32)
CREAT SERPL-MCNC: 0.84 MG/DL (ref 0.67–1.17)
DEPRECATED RDW RBC: 44.5 FL (ref 39–50)
EOSINOPHIL # BLD: 0 K/MCL (ref 0–0.5)
EOSINOPHIL NFR BLD: 0 %
ERYTHROCYTE [DISTWIDTH] IN BLOOD: 14.2 % (ref 11–15)
FASTING DURATION TIME PATIENT: ABNORMAL H
GFR SERPLBLD BASED ON 1.73 SQ M-ARVRAT: 82 ML/MIN/1.73M2
GLUCOSE SERPL-MCNC: 137 MG/DL (ref 65–99)
HCT VFR BLD CALC: 37.3 % (ref 39–51)
HGB BLD-MCNC: 12.8 G/DL (ref 13–17)
IMM GRANULOCYTES # BLD AUTO: 0 K/MCL (ref 0–0.2)
IMM GRANULOCYTES # BLD: 0 %
LYMPHOCYTES # BLD: 1.6 K/MCL (ref 1–4)
LYMPHOCYTES NFR BLD: 17 %
MAGNESIUM SERPL-MCNC: 2.1 MG/DL (ref 1.7–2.4)
MCH RBC QN AUTO: 29.6 PG (ref 26–34)
MCHC RBC AUTO-ENTMCNC: 34.3 G/DL (ref 32–36.5)
MCV RBC AUTO: 86.1 FL (ref 78–100)
MONOCYTES # BLD: 0.9 K/MCL (ref 0.3–0.9)
MONOCYTES NFR BLD: 9 %
NEUTROPHILS # BLD: 7.2 K/MCL (ref 1.8–7.7)
NEUTROPHILS NFR BLD: 73 %
NRBC BLD MANUAL-RTO: 0 /100 WBC
PHOSPHATE SERPL-MCNC: 2.3 MG/DL (ref 2.4–4.7)
PLATELET # BLD AUTO: 160 K/MCL (ref 140–450)
POTASSIUM SERPL-SCNC: 3.7 MMOL/L (ref 3.4–5.1)
RBC # BLD: 4.33 MIL/MCL (ref 4.5–5.9)
SODIUM SERPL-SCNC: 138 MMOL/L (ref 135–145)
WBC # BLD: 9.8 K/MCL (ref 4.2–11)

## 2021-07-16 PROCEDURE — 83735 ASSAY OF MAGNESIUM: CPT | Performed by: STUDENT IN AN ORGANIZED HEALTH CARE EDUCATION/TRAINING PROGRAM

## 2021-07-16 PROCEDURE — 10002800 HB RX 250 W HCPCS: Performed by: STUDENT IN AN ORGANIZED HEALTH CARE EDUCATION/TRAINING PROGRAM

## 2021-07-16 PROCEDURE — 10002803 HB RX 637: Performed by: INTERNAL MEDICINE

## 2021-07-16 PROCEDURE — 84100 ASSAY OF PHOSPHORUS: CPT | Performed by: STUDENT IN AN ORGANIZED HEALTH CARE EDUCATION/TRAINING PROGRAM

## 2021-07-16 PROCEDURE — 10002803 HB RX 637: Performed by: SURGERY

## 2021-07-16 PROCEDURE — 80048 BASIC METABOLIC PNL TOTAL CA: CPT | Performed by: STUDENT IN AN ORGANIZED HEALTH CARE EDUCATION/TRAINING PROGRAM

## 2021-07-16 PROCEDURE — 10000002 HB ROOM CHARGE MED SURG

## 2021-07-16 PROCEDURE — 85025 COMPLETE CBC W/AUTO DIFF WBC: CPT | Performed by: STUDENT IN AN ORGANIZED HEALTH CARE EDUCATION/TRAINING PROGRAM

## 2021-07-16 PROCEDURE — 99233 SBSQ HOSP IP/OBS HIGH 50: CPT | Performed by: INTERNAL MEDICINE

## 2021-07-16 PROCEDURE — 36415 COLL VENOUS BLD VENIPUNCTURE: CPT | Performed by: STUDENT IN AN ORGANIZED HEALTH CARE EDUCATION/TRAINING PROGRAM

## 2021-07-16 PROCEDURE — 10002803 HB RX 637: Performed by: OTOLARYNGOLOGY

## 2021-07-16 RX ORDER — POTASSIUM CHLORIDE 20 MEQ/1
40 TABLET, EXTENDED RELEASE ORAL ONCE
Status: COMPLETED | OUTPATIENT
Start: 2021-07-16 | End: 2021-07-16

## 2021-07-16 RX ORDER — SENNOSIDES A AND B 8.6 MG/1
1 TABLET, FILM COATED ORAL NIGHTLY
Status: DISCONTINUED | OUTPATIENT
Start: 2021-07-16 | End: 2021-07-17 | Stop reason: HOSPADM

## 2021-07-16 RX ORDER — ACETAMINOPHEN 160 MG/5ML
500 LIQUID ORAL EVERY 6 HOURS PRN
Status: DISCONTINUED | OUTPATIENT
Start: 2021-07-16 | End: 2021-07-17 | Stop reason: HOSPADM

## 2021-07-16 RX ORDER — DOCUSATE SODIUM 100 MG/1
100 CAPSULE, LIQUID FILLED ORAL DAILY
Status: DISCONTINUED | OUTPATIENT
Start: 2021-07-16 | End: 2021-07-16

## 2021-07-16 RX ORDER — DOCUSATE SODIUM 100 MG/1
100 CAPSULE, LIQUID FILLED ORAL NIGHTLY
Status: DISCONTINUED | OUTPATIENT
Start: 2021-07-16 | End: 2021-07-17 | Stop reason: HOSPADM

## 2021-07-16 RX ADMIN — POTASSIUM CHLORIDE 40 MEQ: 1500 TABLET, EXTENDED RELEASE ORAL at 08:36

## 2021-07-16 RX ADMIN — STANDARDIZED SENNA CONCENTRATE 8.6 MG: 8.6 TABLET ORAL at 21:08

## 2021-07-16 RX ADMIN — DIBASIC SODIUM PHOSPHATE, MONOBASIC POTASSIUM PHOSPHATE AND MONOBASIC SODIUM PHOSPHATE 250 MG: 852; 155; 130 TABLET ORAL at 13:18

## 2021-07-16 RX ADMIN — ENOXAPARIN SODIUM 40 MG: 40 INJECTION SUBCUTANEOUS at 08:39

## 2021-07-16 RX ADMIN — DOCUSATE SODIUM 100 MG: 100 CAPSULE, LIQUID FILLED ORAL at 21:08

## 2021-07-16 RX ADMIN — AMLODIPINE BESYLATE 10 MG: 10 TABLET ORAL at 08:38

## 2021-07-16 RX ADMIN — CHLORHEXIDINE GLUCONATE 15 ML: 1.2 RINSE ORAL at 21:08

## 2021-07-16 RX ADMIN — ATORVASTATIN CALCIUM 10 MG: 10 TABLET, FILM COATED ORAL at 21:08

## 2021-07-16 RX ADMIN — CHLORHEXIDINE GLUCONATE 15 ML: 1.2 RINSE ORAL at 08:39

## 2021-07-16 RX ADMIN — LEVOTHYROXINE SODIUM 25 MCG: 0.03 TABLET ORAL at 05:23

## 2021-07-16 RX ADMIN — DIBASIC SODIUM PHOSPHATE, MONOBASIC POTASSIUM PHOSPHATE AND MONOBASIC SODIUM PHOSPHATE 250 MG: 852; 155; 130 TABLET ORAL at 08:38

## 2021-07-16 RX ADMIN — ACETAMINOPHEN 500 MG: 650 SOLUTION ORAL at 22:22

## 2021-07-16 RX ADMIN — ACETAMINOPHEN 500 MG: 650 SOLUTION ORAL at 16:14

## 2021-07-16 ASSESSMENT — PAIN SCALES - GENERAL
PAINLEVEL_OUTOF10: 6
PAINLEVEL_OUTOF10: 0
PAINLEVEL_OUTOF10: 6
PAINLEVEL_OUTOF10: 4
PAINLEVEL_OUTOF10: 5

## 2021-07-17 VITALS
OXYGEN SATURATION: 97 % | SYSTOLIC BLOOD PRESSURE: 136 MMHG | WEIGHT: 137.35 LBS | DIASTOLIC BLOOD PRESSURE: 69 MMHG | RESPIRATION RATE: 16 BRPM | HEIGHT: 62 IN | TEMPERATURE: 97.9 F | BODY MASS INDEX: 25.27 KG/M2 | HEART RATE: 67 BPM

## 2021-07-17 LAB
ANION GAP SERPL CALC-SCNC: 12 MMOL/L (ref 10–20)
BASOPHILS # BLD: 0 K/MCL (ref 0–0.3)
BASOPHILS NFR BLD: 0 %
BUN SERPL-MCNC: 10 MG/DL (ref 6–20)
BUN/CREAT SERPL: 12 (ref 7–25)
CALCIUM SERPL-MCNC: 8.4 MG/DL (ref 8.4–10.2)
CHLORIDE SERPL-SCNC: 102 MMOL/L (ref 98–107)
CO2 SERPL-SCNC: 25 MMOL/L (ref 21–32)
CREAT SERPL-MCNC: 0.83 MG/DL (ref 0.67–1.17)
DEPRECATED RDW RBC: 43.8 FL (ref 39–50)
EOSINOPHIL # BLD: 0.1 K/MCL (ref 0–0.5)
EOSINOPHIL NFR BLD: 1 %
ERYTHROCYTE [DISTWIDTH] IN BLOOD: 13.9 % (ref 11–15)
FASTING DURATION TIME PATIENT: ABNORMAL H
GFR SERPLBLD BASED ON 1.73 SQ M-ARVRAT: 82 ML/MIN/1.73M2
GLUCOSE SERPL-MCNC: 149 MG/DL (ref 65–99)
HCT VFR BLD CALC: 37.2 % (ref 39–51)
HGB BLD-MCNC: 12.7 G/DL (ref 13–17)
IMM GRANULOCYTES # BLD AUTO: 0 K/MCL (ref 0–0.2)
IMM GRANULOCYTES # BLD: 0 %
LYMPHOCYTES # BLD: 1.4 K/MCL (ref 1–4)
LYMPHOCYTES NFR BLD: 15 %
MAGNESIUM SERPL-MCNC: 2 MG/DL (ref 1.7–2.4)
MCH RBC QN AUTO: 29.7 PG (ref 26–34)
MCHC RBC AUTO-ENTMCNC: 34.1 G/DL (ref 32–36.5)
MCV RBC AUTO: 86.9 FL (ref 78–100)
MONOCYTES # BLD: 0.9 K/MCL (ref 0.3–0.9)
MONOCYTES NFR BLD: 9 %
NEUTROPHILS # BLD: 6.8 K/MCL (ref 1.8–7.7)
NEUTROPHILS NFR BLD: 75 %
NRBC BLD MANUAL-RTO: 0 /100 WBC
PHOSPHATE SERPL-MCNC: 3 MG/DL (ref 2.4–4.7)
PLATELET # BLD AUTO: 167 K/MCL (ref 140–450)
POTASSIUM SERPL-SCNC: 3.7 MMOL/L (ref 3.4–5.1)
RBC # BLD: 4.28 MIL/MCL (ref 4.5–5.9)
SODIUM SERPL-SCNC: 135 MMOL/L (ref 135–145)
WBC # BLD: 9.3 K/MCL (ref 4.2–11)

## 2021-07-17 PROCEDURE — 84100 ASSAY OF PHOSPHORUS: CPT | Performed by: STUDENT IN AN ORGANIZED HEALTH CARE EDUCATION/TRAINING PROGRAM

## 2021-07-17 PROCEDURE — 83735 ASSAY OF MAGNESIUM: CPT | Performed by: STUDENT IN AN ORGANIZED HEALTH CARE EDUCATION/TRAINING PROGRAM

## 2021-07-17 PROCEDURE — 99232 SBSQ HOSP IP/OBS MODERATE 35: CPT | Performed by: INTERNAL MEDICINE

## 2021-07-17 PROCEDURE — 36415 COLL VENOUS BLD VENIPUNCTURE: CPT | Performed by: STUDENT IN AN ORGANIZED HEALTH CARE EDUCATION/TRAINING PROGRAM

## 2021-07-17 PROCEDURE — 85025 COMPLETE CBC W/AUTO DIFF WBC: CPT | Performed by: STUDENT IN AN ORGANIZED HEALTH CARE EDUCATION/TRAINING PROGRAM

## 2021-07-17 PROCEDURE — 80048 BASIC METABOLIC PNL TOTAL CA: CPT | Performed by: STUDENT IN AN ORGANIZED HEALTH CARE EDUCATION/TRAINING PROGRAM

## 2021-07-17 PROCEDURE — 10002803 HB RX 637: Performed by: STUDENT IN AN ORGANIZED HEALTH CARE EDUCATION/TRAINING PROGRAM

## 2021-07-17 PROCEDURE — 10002803 HB RX 637: Performed by: SURGERY

## 2021-07-17 PROCEDURE — 10002800 HB RX 250 W HCPCS: Performed by: STUDENT IN AN ORGANIZED HEALTH CARE EDUCATION/TRAINING PROGRAM

## 2021-07-17 PROCEDURE — 10002803 HB RX 637: Performed by: INTERNAL MEDICINE

## 2021-07-17 PROCEDURE — 10002803 HB RX 637: Performed by: OTOLARYNGOLOGY

## 2021-07-17 PROCEDURE — 10004651 HB RX, NO CHARGE ITEM: Performed by: OTOLARYNGOLOGY

## 2021-07-17 RX ORDER — PSEUDOEPHEDRINE HCL 30 MG
100 TABLET ORAL 2 TIMES DAILY
Qty: 1 CAPSULE | Refills: 0 | INPATIENT
Start: 2021-07-17

## 2021-07-17 RX ORDER — POTASSIUM CHLORIDE 20 MEQ/1
40 TABLET, EXTENDED RELEASE ORAL ONCE
Status: DISCONTINUED | OUTPATIENT
Start: 2021-07-17 | End: 2021-07-17 | Stop reason: SDUPTHER

## 2021-07-17 RX ORDER — POTASSIUM CHLORIDE 1.5 G/1.58G
40 POWDER, FOR SOLUTION ORAL ONCE
Status: COMPLETED | OUTPATIENT
Start: 2021-07-17 | End: 2021-07-17

## 2021-07-17 RX ORDER — HYDROCODONE BITARTRATE AND ACETAMINOPHEN 5; 325 MG/1; MG/1
1 TABLET ORAL EVERY 6 HOURS PRN
Qty: 20 TABLET | Refills: 0 | INPATIENT
Start: 2021-07-17

## 2021-07-17 RX ADMIN — SODIUM CHLORIDE, PRESERVATIVE FREE 2 ML: 5 INJECTION INTRAVENOUS at 01:10

## 2021-07-17 RX ADMIN — SODIUM CHLORIDE, PRESERVATIVE FREE 2 ML: 5 INJECTION INTRAVENOUS at 09:00

## 2021-07-17 RX ADMIN — LEVOTHYROXINE SODIUM 25 MCG: 0.03 TABLET ORAL at 05:31

## 2021-07-17 RX ADMIN — ENOXAPARIN SODIUM 40 MG: 40 INJECTION SUBCUTANEOUS at 09:00

## 2021-07-17 RX ADMIN — ACETAMINOPHEN 500 MG: 650 SOLUTION ORAL at 05:30

## 2021-07-17 RX ADMIN — CHLORHEXIDINE GLUCONATE 15 ML: 1.2 RINSE ORAL at 09:00

## 2021-07-17 RX ADMIN — POTASSIUM CHLORIDE 40 MEQ: 1.5 FOR SOLUTION ORAL at 11:00

## 2021-07-17 RX ADMIN — AMLODIPINE BESYLATE 7.5 MG: 2.5 TABLET ORAL at 08:59

## 2021-07-17 ASSESSMENT — PAIN SCALES - GENERAL
PAINLEVEL_OUTOF10: 5
PAINLEVEL_OUTOF10: 5
PAINLEVEL_OUTOF10: 2

## 2021-07-22 LAB
ASR DISCLAIMER: NORMAL
CASE RPRT: NORMAL
CLINICAL INFO: NORMAL
IHC PROGNOSTIC MARKER RESULT, ADDENDUM: NORMAL
PATH REPORT ADDENDUM.SYNOPTIC DOC: NORMAL
PATH REPORT.FINAL DX SPEC: NORMAL
PATH REPORT.FINAL DX SPEC: NORMAL
PATH REPORT.GROSS SPEC: NORMAL
PATH REPORT.INTRAOP OBS SPEC DOC: NORMAL

## 2022-03-30 ENCOUNTER — TELEPHONE (OUTPATIENT)
Dept: ORTHOPEDICS | Age: 82
End: 2022-03-30

## 2022-04-05 ENCOUNTER — OFFICE VISIT (OUTPATIENT)
Dept: ORTHOPEDICS | Age: 82
End: 2022-04-05

## 2022-04-05 ENCOUNTER — TELEPHONE (OUTPATIENT)
Dept: ORTHOPEDICS | Age: 82
End: 2022-04-05

## 2022-04-05 VITALS — BODY MASS INDEX: 23.08 KG/M2 | WEIGHT: 125.44 LBS | HEIGHT: 62 IN

## 2022-04-05 DIAGNOSIS — M17.12 PRIMARY OSTEOARTHRITIS OF LEFT KNEE: Primary | ICD-10-CM

## 2022-04-05 PROCEDURE — 99213 OFFICE O/P EST LOW 20 MIN: CPT | Performed by: ORTHOPAEDIC SURGERY

## 2022-04-05 RX ORDER — AZELASTINE 1 MG/ML
SPRAY, METERED NASAL EVERY 12 HOURS
COMMUNITY
Start: 2021-10-19

## 2022-04-05 RX ORDER — FLUTICASONE PROPIONATE 50 MCG
SPRAY, SUSPENSION (ML) NASAL EVERY 24 HOURS
COMMUNITY
Start: 2021-10-19

## 2022-04-05 RX ORDER — HYALURONATE SODIUM 30 MG/2 ML
30 SYRINGE (ML) INTRAARTICULAR ONCE
Qty: 2 ML | Refills: 0 | Status: SHIPPED
Start: 2022-04-05 | End: 2022-04-05

## 2022-04-05 ASSESSMENT — PAIN SCALES - GENERAL: PAINLEVEL: 6

## 2022-04-19 ENCOUNTER — OFFICE VISIT (OUTPATIENT)
Dept: ORTHOPEDICS | Age: 82
End: 2022-04-19

## 2022-04-19 VITALS — BODY MASS INDEX: 22.48 KG/M2 | WEIGHT: 126.88 LBS | HEIGHT: 63 IN

## 2022-04-19 DIAGNOSIS — M17.12 PRIMARY OSTEOARTHRITIS OF LEFT KNEE: Primary | ICD-10-CM

## 2022-04-19 PROCEDURE — 20610 DRAIN/INJ JOINT/BURSA W/O US: CPT | Performed by: ORTHOPAEDIC SURGERY

## 2022-04-26 ENCOUNTER — OFFICE VISIT (OUTPATIENT)
Dept: ORTHOPEDICS | Age: 82
End: 2022-04-26

## 2022-04-26 VITALS — WEIGHT: 126 LBS | HEIGHT: 63 IN | BODY MASS INDEX: 22.32 KG/M2

## 2022-04-26 DIAGNOSIS — M17.12 PRIMARY OSTEOARTHRITIS OF LEFT KNEE: Primary | ICD-10-CM

## 2022-04-26 PROCEDURE — 20610 DRAIN/INJ JOINT/BURSA W/O US: CPT | Performed by: ORTHOPAEDIC SURGERY

## 2022-05-03 ENCOUNTER — OFFICE VISIT (OUTPATIENT)
Dept: ORTHOPEDICS | Age: 82
End: 2022-05-03

## 2022-05-03 VITALS — WEIGHT: 126.1 LBS | BODY MASS INDEX: 22.34 KG/M2 | HEIGHT: 63 IN

## 2022-05-03 DIAGNOSIS — M17.12 PRIMARY OSTEOARTHRITIS OF LEFT KNEE: Primary | ICD-10-CM

## 2022-05-03 PROCEDURE — 20610 DRAIN/INJ JOINT/BURSA W/O US: CPT | Performed by: PHYSICIAN ASSISTANT

## 2022-05-03 RX ORDER — KETOROLAC TROMETHAMINE 30 MG/ML
30 INJECTION, SOLUTION INTRAMUSCULAR; INTRAVENOUS
Status: COMPLETED | OUTPATIENT
Start: 2022-05-03 | End: 2022-05-03

## 2022-05-03 RX ADMIN — KETOROLAC TROMETHAMINE 30 MG: 30 INJECTION, SOLUTION INTRAMUSCULAR; INTRAVENOUS at 10:22

## 2022-05-03 ASSESSMENT — PAIN SCALES - GENERAL: PAINLEVEL: 5

## 2022-08-22 ENCOUNTER — TELEPHONE (OUTPATIENT)
Dept: ORTHOPEDICS | Age: 82
End: 2022-08-22

## 2022-08-26 ENCOUNTER — TELEPHONE (OUTPATIENT)
Dept: ORTHOPEDICS | Age: 82
End: 2022-08-26

## 2024-08-16 ENCOUNTER — APPOINTMENT (OUTPATIENT)
Dept: ORTHOPEDICS | Age: 84
End: 2024-08-16

## 2024-08-16 VITALS — HEIGHT: 63 IN | BODY MASS INDEX: 22.32 KG/M2 | WEIGHT: 126 LBS

## 2024-08-16 DIAGNOSIS — M17.12 PRIMARY OSTEOARTHRITIS OF LEFT KNEE: Primary | ICD-10-CM

## 2024-08-16 RX ORDER — HYALURONATE SODIUM 16.8MG/2ML
16.8 SYRINGE (ML) INTRAARTICULAR ONCE
Qty: 2 ML | Refills: 0 | Status: SHIPPED
Start: 2024-08-16 | End: 2024-08-16

## 2024-08-26 ENCOUNTER — TELEPHONE (OUTPATIENT)
Dept: ORTHOPEDICS | Age: 84
End: 2024-08-26

## 2024-08-26 DIAGNOSIS — M17.12 PRIMARY OSTEOARTHRITIS OF LEFT KNEE: Primary | ICD-10-CM

## 2024-08-26 RX ORDER — HYALURONATE SODIUM 16.8MG/2ML
16.8 SYRINGE (ML) INTRAARTICULAR ONCE
Qty: 2 ML | Refills: 0 | Status: SHIPPED
Start: 2024-08-26 | End: 2024-08-26

## 2024-08-29 ENCOUNTER — TELEPHONE (OUTPATIENT)
Dept: ORTHOPEDICS | Age: 84
End: 2024-08-29

## 2024-08-29 DIAGNOSIS — M17.12 PRIMARY OSTEOARTHRITIS OF LEFT KNEE: Primary | ICD-10-CM

## 2024-08-29 RX ORDER — HYALURONATE SODIUM 16.8MG/2ML
16.8 SYRINGE (ML) INTRAARTICULAR
Qty: 6 ML | Refills: 0 | Status: SHIPPED
Start: 2024-08-29 | End: 2024-09-13

## 2024-09-03 ENCOUNTER — APPOINTMENT (OUTPATIENT)
Dept: ORTHOPEDICS | Age: 84
End: 2024-09-03

## 2024-09-03 VITALS — BODY MASS INDEX: 22.32 KG/M2 | HEIGHT: 63 IN | WEIGHT: 126 LBS

## 2024-09-03 DIAGNOSIS — M17.12 PRIMARY OSTEOARTHRITIS OF LEFT KNEE: Primary | ICD-10-CM

## 2024-09-10 ENCOUNTER — APPOINTMENT (OUTPATIENT)
Dept: ORTHOPEDICS | Age: 84
End: 2024-09-10

## 2024-09-10 DIAGNOSIS — M17.12 PRIMARY OSTEOARTHRITIS OF LEFT KNEE: Primary | ICD-10-CM

## 2024-09-17 ENCOUNTER — APPOINTMENT (OUTPATIENT)
Dept: ORTHOPEDICS | Age: 84
End: 2024-09-17

## 2024-09-17 VITALS — BODY MASS INDEX: 22.32 KG/M2 | HEIGHT: 63 IN | WEIGHT: 126 LBS

## 2024-09-17 DIAGNOSIS — M17.12 PRIMARY OSTEOARTHRITIS OF LEFT KNEE: Primary | ICD-10-CM

## 2025-06-03 ENCOUNTER — OFFICE VISIT (OUTPATIENT)
Dept: ORTHOPEDICS | Age: 85
End: 2025-06-03

## 2025-06-03 ENCOUNTER — TELEPHONE (OUTPATIENT)
Dept: ORTHOPEDICS | Age: 85
End: 2025-06-03

## 2025-06-03 ENCOUNTER — IMAGING SERVICES (OUTPATIENT)
Dept: GENERAL RADIOLOGY | Age: 85
End: 2025-06-03
Attending: ORTHOPAEDIC SURGERY

## 2025-06-03 VITALS — HEIGHT: 63 IN | WEIGHT: 127.65 LBS | BODY MASS INDEX: 22.62 KG/M2

## 2025-06-03 DIAGNOSIS — M17.12 PRIMARY OSTEOARTHRITIS OF LEFT KNEE: Primary | ICD-10-CM

## 2025-06-03 PROCEDURE — 99213 OFFICE O/P EST LOW 20 MIN: CPT | Performed by: ORTHOPAEDIC SURGERY

## 2025-06-03 PROCEDURE — 73564 X-RAY EXAM KNEE 4 OR MORE: CPT | Performed by: ORTHOPAEDIC SURGERY

## 2025-06-03 RX ORDER — HYALURONATE SODIUM 16.8MG/2ML
16.8 SYRINGE (ML) INTRAARTICULAR
Qty: 3 EACH | Refills: 0 | Status: SHIPPED | OUTPATIENT
Start: 2025-06-03

## 2025-06-10 ENCOUNTER — APPOINTMENT (OUTPATIENT)
Dept: ORTHOPEDICS | Age: 85
End: 2025-06-10

## 2025-06-10 VITALS — WEIGHT: 127 LBS | BODY MASS INDEX: 22.5 KG/M2 | HEIGHT: 63 IN

## 2025-06-10 DIAGNOSIS — M17.12 PRIMARY OSTEOARTHRITIS OF LEFT KNEE: Primary | ICD-10-CM

## 2025-06-17 ENCOUNTER — OFFICE VISIT (OUTPATIENT)
Dept: ORTHOPEDICS | Age: 85
End: 2025-06-17

## 2025-06-17 ENCOUNTER — APPOINTMENT (OUTPATIENT)
Dept: ORTHOPEDICS | Age: 85
End: 2025-06-17

## 2025-06-17 VITALS — BODY MASS INDEX: 23.37 KG/M2 | WEIGHT: 127 LBS | HEIGHT: 62 IN

## 2025-06-17 DIAGNOSIS — M17.12 PRIMARY OSTEOARTHRITIS OF LEFT KNEE: Primary | ICD-10-CM

## 2025-06-24 ENCOUNTER — APPOINTMENT (OUTPATIENT)
Dept: ORTHOPEDICS | Age: 85
End: 2025-06-24

## 2025-06-24 VITALS — HEIGHT: 62 IN | BODY MASS INDEX: 23.37 KG/M2 | WEIGHT: 126.98 LBS

## 2025-06-24 DIAGNOSIS — M17.12 PRIMARY OSTEOARTHRITIS OF LEFT KNEE: Primary | ICD-10-CM

## 2025-06-24 PROCEDURE — 20610 DRAIN/INJ JOINT/BURSA W/O US: CPT | Performed by: ORTHOPAEDIC SURGERY

## (undated) DEVICE — Device

## (undated) DEVICE — LAWSON - DRAIN 10MM FLT FULL PERF

## (undated) DEVICE — LAWSON - CUP MEDICINE STL 2OZ 2CUP PKG

## (undated) DEVICE — LAWSON - HANDLE LTE MONO RIGID

## (undated) DEVICE — LAWSON - CONTAINER SPCMN STL 5OZ

## (undated) DEVICE — LAWSON - DEPRESSOR ADLT TONGUE STL 6IN

## (undated) NOTE — ED AVS SNAPSHOT
Marlene Damian   MRN: [id number]    Department:  [facility] Emergency Department in [place]   Date of Visit:  6/8/2019           Disclosure     Insurance plans vary and the physician(s) referred by the ER may not be covered by your plan.  Please contac tell this physician (or your personal doctor if your instructions are to return to your personal doctor) about any new or lasting problems. The primary care or specialist physician will see patients referred from the [facility] Emergency Department.  Kris Mera